# Patient Record
Sex: FEMALE | Race: WHITE | NOT HISPANIC OR LATINO
[De-identification: names, ages, dates, MRNs, and addresses within clinical notes are randomized per-mention and may not be internally consistent; named-entity substitution may affect disease eponyms.]

---

## 2019-01-27 PROBLEM — Z00.00 ENCOUNTER FOR PREVENTIVE HEALTH EXAMINATION: Status: ACTIVE | Noted: 2019-01-27

## 2019-02-26 ENCOUNTER — APPOINTMENT (OUTPATIENT)
Dept: NEUROLOGY | Facility: CLINIC | Age: 74
End: 2019-02-26
Payer: MEDICARE

## 2019-02-26 VITALS
HEART RATE: 75 BPM | WEIGHT: 127 LBS | SYSTOLIC BLOOD PRESSURE: 134 MMHG | BODY MASS INDEX: 21.68 KG/M2 | TEMPERATURE: 98 F | HEIGHT: 64 IN | DIASTOLIC BLOOD PRESSURE: 89 MMHG

## 2019-02-26 DIAGNOSIS — Z87.891 PERSONAL HISTORY OF NICOTINE DEPENDENCE: ICD-10-CM

## 2019-02-26 DIAGNOSIS — F32.9 MAJOR DEPRESSIVE DISORDER, SINGLE EPISODE, UNSPECIFIED: ICD-10-CM

## 2019-02-26 DIAGNOSIS — Z86.79 PERSONAL HISTORY OF OTHER DISEASES OF THE CIRCULATORY SYSTEM: ICD-10-CM

## 2019-02-26 DIAGNOSIS — Z82.3 FAMILY HISTORY OF STROKE: ICD-10-CM

## 2019-02-26 DIAGNOSIS — Z83.79 FAMILY HISTORY OF OTHER DISEASES OF THE DIGESTIVE SYSTEM: ICD-10-CM

## 2019-02-26 DIAGNOSIS — R25.1 TREMOR, UNSPECIFIED: ICD-10-CM

## 2019-02-26 DIAGNOSIS — Z80.0 FAMILY HISTORY OF MALIGNANT NEOPLASM OF DIGESTIVE ORGANS: ICD-10-CM

## 2019-02-26 DIAGNOSIS — G25.0 ESSENTIAL TREMOR: ICD-10-CM

## 2019-02-26 DIAGNOSIS — Z86.59 PERSONAL HISTORY OF OTHER MENTAL AND BEHAVIORAL DISORDERS: ICD-10-CM

## 2019-02-26 DIAGNOSIS — I10 ESSENTIAL (PRIMARY) HYPERTENSION: ICD-10-CM

## 2019-02-26 DIAGNOSIS — Z82.62 FAMILY HISTORY OF OSTEOPOROSIS: ICD-10-CM

## 2019-02-26 PROCEDURE — 99205 OFFICE O/P NEW HI 60 MIN: CPT

## 2019-02-26 RX ORDER — ESCITALOPRAM OXALATE 5 MG/1
5 TABLET, FILM COATED ORAL
Refills: 0 | Status: ACTIVE | COMMUNITY

## 2019-02-26 RX ORDER — AMLODIPINE BESYLATE 5 MG/1
5 TABLET ORAL
Refills: 0 | Status: ACTIVE | COMMUNITY

## 2019-02-26 RX ORDER — PROPRANOLOL HYDROCHLORIDE 60 MG/1
60 CAPSULE, EXTENDED RELEASE ORAL
Qty: 60 | Refills: 5 | Status: ACTIVE | COMMUNITY
Start: 2019-02-26 | End: 1900-01-01

## 2019-02-26 NOTE — REVIEW OF SYSTEMS
[Anxiety] : anxiety [Depression] : depression [Red Eyes] : red eyes [Negative] : Heme/Lymph [FreeTextEntry2] : change in weight,allergies [de-identified] : tremor,head injury [de-identified] : mood swings [FreeTextEntry4] : rash [FreeTextEntry8] : night time urination [FreeTextEntry9] : back pain,neck pain

## 2019-02-26 NOTE — PHYSICAL EXAM
[FreeTextEntry1] : Physical examination \par General: No acute distress, Awake, Alert.   \par Fundus: disc margins sharp.   \par Neck: no Carotid bruit.   \par Cardiovascular: Normal rate, Regular rhythm, No murmur.  \par \par Mental status \par Awake, alert, and oriented to person, time and place, Normal attention span and concentration, Recent and remote memory intact, Language intact, Fund of knowledge intact.   \par \par Cranial Nerves \par II: VFF  \par III, IV, VI: PERRL, EOMI.   \par V: Facial sensation is normal B/L.   \par VII: Facial strength is normal B/L. \par VIII: Gross hearing is intact.   \par IX, X: Palate is midline and elevates symmetrically.   \par XI: Trapezius normal strength.   \par XII: Tongue midline without atrophy or fasciculations. \par \par Motor exam  \par Muscle tone -   \par No atrophy or fasciculations Very mild cogwheel rigidity\par Muscle Strength: arms and legs, proximal and distal flexors and extensors are normal \par No UE drift.\par Tremor with action and posture, moderate frequency of very low amplitude.\par Vocal tremor\par Please see scanned document for spiral.\par \par Reflexes \par All present, normal, and symmetrical.   \par \par Plantars right: mute.   \par Plantars left: mute.   \par \par \par Coordination \par Finger to nose: Normal.  \par Heel to shin: Normal.   \par \par \par Sensory \par Intact sensation to vibration and cold.\par \par \par \par Gait \par Normal including heels, toes, and tandem gait.  \par \par \par

## 2019-02-26 NOTE — ASSESSMENT
[FreeTextEntry1] : Ms. Escamilla is a 73-year-old woman with essential tremor - including a vocal tremor which is significantly impacting her quality of life - she used to sing in several groups and also sang solo and currently only sings in 1 group.\par I would consider the following medications only after discussing with her primary care doctor - propranolol, Mysoline, Topamax.\par TSH if not done previously. \par \par D/W Dr. Clinton - no contraindication for propranolol. Will give ER 60 mg daily for two weeks then increase to 120 mg daily after BP, pulse check.

## 2019-02-26 NOTE — HISTORY OF PRESENT ILLNESS
[FreeTextEntry1] : Ms. Escamilla is a 73-year-old woman with a change in her voice over the past few years. She describes it as a tremor in her voice. She is a [place] who used to sing in several groups and also sang solo. Because of the tremor she no longer sings solo and only sings in one group.  She has a history of depression and is on Lexapro. She also has a very mild tremor in her hands and the left side greater than the right. This does not interfere with her function.  No Fx history of tremor.

## 2019-05-29 ENCOUNTER — APPOINTMENT (OUTPATIENT)
Dept: NEUROLOGY | Facility: CLINIC | Age: 74
End: 2019-05-29

## 2023-02-10 ENCOUNTER — TRANSCRIPTION ENCOUNTER (OUTPATIENT)
Age: 78
End: 2023-02-10